# Patient Record
Sex: FEMALE | Race: WHITE | NOT HISPANIC OR LATINO | URBAN - METROPOLITAN AREA
[De-identification: names, ages, dates, MRNs, and addresses within clinical notes are randomized per-mention and may not be internally consistent; named-entity substitution may affect disease eponyms.]

---

## 2018-09-27 ENCOUNTER — INPATIENT (INPATIENT)
Facility: HOSPITAL | Age: 28
LOS: 1 days | Discharge: HOME | DRG: 872 | End: 2018-09-29
Attending: HOSPITALIST | Admitting: FAMILY MEDICINE
Payer: COMMERCIAL

## 2018-09-27 VITALS — HEIGHT: 67 IN | WEIGHT: 113.98 LBS

## 2018-09-27 DIAGNOSIS — A41.9 SEPSIS, UNSPECIFIED ORGANISM: ICD-10-CM

## 2018-09-27 DIAGNOSIS — Z98.890 OTHER SPECIFIED POSTPROCEDURAL STATES: Chronic | ICD-10-CM

## 2018-09-27 DIAGNOSIS — E87.6 HYPOKALEMIA: ICD-10-CM

## 2018-09-27 DIAGNOSIS — N12 TUBULO-INTERSTITIAL NEPHRITIS, NOT SPECIFIED AS ACUTE OR CHRONIC: ICD-10-CM

## 2018-09-27 LAB
ALBUMIN SERPL ELPH-MCNC: 3.7 G/DL — SIGNIFICANT CHANGE UP (ref 3.3–5.2)
ALP SERPL-CCNC: 75 U/L — SIGNIFICANT CHANGE UP (ref 40–120)
ALT FLD-CCNC: 13 U/L — SIGNIFICANT CHANGE UP
ANION GAP SERPL CALC-SCNC: 15 MMOL/L — SIGNIFICANT CHANGE UP (ref 5–17)
APPEARANCE UR: ABNORMAL
APTT BLD: 32.8 SEC — SIGNIFICANT CHANGE UP (ref 27.5–37.4)
AST SERPL-CCNC: 17 U/L — SIGNIFICANT CHANGE UP
BACTERIA # UR AUTO: ABNORMAL
BASOPHILS # BLD AUTO: 0 K/UL — SIGNIFICANT CHANGE UP (ref 0–0.2)
BILIRUB SERPL-MCNC: 0.4 MG/DL — SIGNIFICANT CHANGE UP (ref 0.4–2)
BILIRUB UR-MCNC: NEGATIVE — SIGNIFICANT CHANGE UP
BUN SERPL-MCNC: 13 MG/DL — SIGNIFICANT CHANGE UP (ref 8–20)
CALCIUM SERPL-MCNC: 9.2 MG/DL — SIGNIFICANT CHANGE UP (ref 8.6–10.2)
CHLORIDE SERPL-SCNC: 92 MMOL/L — LOW (ref 98–107)
CO2 SERPL-SCNC: 25 MMOL/L — SIGNIFICANT CHANGE UP (ref 22–29)
COLOR SPEC: YELLOW — SIGNIFICANT CHANGE UP
COMMENT - URINE: SIGNIFICANT CHANGE UP
CREAT SERPL-MCNC: 0.69 MG/DL — SIGNIFICANT CHANGE UP (ref 0.5–1.3)
DIFF PNL FLD: ABNORMAL
EOSINOPHIL # BLD AUTO: 0 K/UL — SIGNIFICANT CHANGE UP (ref 0–0.5)
EPI CELLS # UR: SIGNIFICANT CHANGE UP
GLUCOSE SERPL-MCNC: 112 MG/DL — SIGNIFICANT CHANGE UP (ref 70–115)
GLUCOSE UR QL: NEGATIVE MG/DL — SIGNIFICANT CHANGE UP
HCG SERPL-ACNC: <5 MIU/ML — SIGNIFICANT CHANGE UP
HCT VFR BLD CALC: 42.5 % — SIGNIFICANT CHANGE UP (ref 37–47)
HGB BLD-MCNC: 14.2 G/DL — SIGNIFICANT CHANGE UP (ref 12–16)
INR BLD: 1.3 RATIO — HIGH (ref 0.88–1.16)
KETONES UR-MCNC: NEGATIVE — SIGNIFICANT CHANGE UP
LACTATE BLDV-MCNC: 1.6 MMOL/L — SIGNIFICANT CHANGE UP (ref 0.5–2)
LEUKOCYTE ESTERASE UR-ACNC: ABNORMAL
LYMPHOCYTES # BLD AUTO: 1.1 K/UL — SIGNIFICANT CHANGE UP (ref 1–4.8)
LYMPHOCYTES # BLD AUTO: 9 % — LOW (ref 20–55)
MCHC RBC-ENTMCNC: 28.6 PG — SIGNIFICANT CHANGE UP (ref 27–31)
MCHC RBC-ENTMCNC: 33.4 G/DL — SIGNIFICANT CHANGE UP (ref 32–36)
MCV RBC AUTO: 85.5 FL — SIGNIFICANT CHANGE UP (ref 81–99)
MONOCYTES # BLD AUTO: 1.7 K/UL — HIGH (ref 0–0.8)
MONOCYTES NFR BLD AUTO: 15 % — HIGH (ref 3–10)
NEUTROPHILS # BLD AUTO: 8.8 K/UL — HIGH (ref 1.8–8)
NEUTROPHILS NFR BLD AUTO: 76 % — HIGH (ref 37–73)
NITRITE UR-MCNC: NEGATIVE — SIGNIFICANT CHANGE UP
PH UR: 6.5 — SIGNIFICANT CHANGE UP (ref 5–8)
PLAT MORPH BLD: NORMAL — SIGNIFICANT CHANGE UP
PLATELET # BLD AUTO: 155 K/UL — SIGNIFICANT CHANGE UP (ref 150–400)
POTASSIUM SERPL-MCNC: 3.3 MMOL/L — LOW (ref 3.5–5.3)
POTASSIUM SERPL-SCNC: 3.3 MMOL/L — LOW (ref 3.5–5.3)
PROT SERPL-MCNC: 8 G/DL — SIGNIFICANT CHANGE UP (ref 6.6–8.7)
PROT UR-MCNC: 100 MG/DL
PROTHROM AB SERPL-ACNC: 14.4 SEC — HIGH (ref 9.8–12.7)
RBC # BLD: 4.97 M/UL — SIGNIFICANT CHANGE UP (ref 4.4–5.2)
RBC # FLD: 12.4 % — SIGNIFICANT CHANGE UP (ref 11–15.6)
RBC BLD AUTO: NORMAL — SIGNIFICANT CHANGE UP
SODIUM SERPL-SCNC: 132 MMOL/L — LOW (ref 135–145)
SP GR SPEC: 1 — LOW (ref 1.01–1.02)
UROBILINOGEN FLD QL: NEGATIVE MG/DL — SIGNIFICANT CHANGE UP
WBC # BLD: 11.7 K/UL — HIGH (ref 4.8–10.8)
WBC # FLD AUTO: 11.7 K/UL — HIGH (ref 4.8–10.8)
WBC UR QL: >50

## 2018-09-27 PROCEDURE — 74177 CT ABD & PELVIS W/CONTRAST: CPT | Mod: 26

## 2018-09-27 PROCEDURE — 71045 X-RAY EXAM CHEST 1 VIEW: CPT | Mod: 26

## 2018-09-27 PROCEDURE — 99291 CRITICAL CARE FIRST HOUR: CPT

## 2018-09-27 PROCEDURE — 93010 ELECTROCARDIOGRAM REPORT: CPT

## 2018-09-27 RX ORDER — ACETAMINOPHEN 500 MG
650 TABLET ORAL ONCE
Qty: 0 | Refills: 0 | Status: COMPLETED | OUTPATIENT
Start: 2018-09-27 | End: 2018-09-27

## 2018-09-27 RX ORDER — IBUPROFEN 200 MG
400 TABLET ORAL ONCE
Qty: 0 | Refills: 0 | Status: COMPLETED | OUTPATIENT
Start: 2018-09-27 | End: 2018-09-27

## 2018-09-27 RX ORDER — PIPERACILLIN AND TAZOBACTAM 4; .5 G/20ML; G/20ML
3.38 INJECTION, POWDER, LYOPHILIZED, FOR SOLUTION INTRAVENOUS EVERY 8 HOURS
Qty: 0 | Refills: 0 | Status: DISCONTINUED | OUTPATIENT
Start: 2018-09-27 | End: 2018-09-29

## 2018-09-27 RX ORDER — POTASSIUM CHLORIDE 20 MEQ
40 PACKET (EA) ORAL ONCE
Qty: 0 | Refills: 0 | Status: COMPLETED | OUTPATIENT
Start: 2018-09-27 | End: 2018-09-28

## 2018-09-27 RX ORDER — SODIUM CHLORIDE 9 MG/ML
1000 INJECTION INTRAMUSCULAR; INTRAVENOUS; SUBCUTANEOUS ONCE
Qty: 0 | Refills: 0 | Status: COMPLETED | OUTPATIENT
Start: 2018-09-27 | End: 2018-09-27

## 2018-09-27 RX ORDER — MORPHINE SULFATE 50 MG/1
2 CAPSULE, EXTENDED RELEASE ORAL ONCE
Qty: 0 | Refills: 0 | Status: DISCONTINUED | OUTPATIENT
Start: 2018-09-27 | End: 2018-09-27

## 2018-09-27 RX ORDER — VANCOMYCIN HCL 1 G
1000 VIAL (EA) INTRAVENOUS ONCE
Qty: 0 | Refills: 0 | Status: COMPLETED | OUTPATIENT
Start: 2018-09-27 | End: 2018-09-27

## 2018-09-27 RX ORDER — LACTOBACILLUS ACIDOPHILUS 100MM CELL
1 CAPSULE ORAL
Qty: 0 | Refills: 0 | Status: DISCONTINUED | OUTPATIENT
Start: 2018-09-27 | End: 2018-09-28

## 2018-09-27 RX ORDER — SODIUM CHLORIDE 9 MG/ML
1550 INJECTION INTRAMUSCULAR; INTRAVENOUS; SUBCUTANEOUS ONCE
Qty: 0 | Refills: 0 | Status: COMPLETED | OUTPATIENT
Start: 2018-09-27 | End: 2018-09-27

## 2018-09-27 RX ORDER — ACETAMINOPHEN 500 MG
650 TABLET ORAL EVERY 6 HOURS
Qty: 0 | Refills: 0 | Status: DISCONTINUED | OUTPATIENT
Start: 2018-09-27 | End: 2018-09-29

## 2018-09-27 RX ORDER — SODIUM CHLORIDE 9 MG/ML
1000 INJECTION INTRAMUSCULAR; INTRAVENOUS; SUBCUTANEOUS
Qty: 0 | Refills: 0 | Status: DISCONTINUED | OUTPATIENT
Start: 2018-09-27 | End: 2018-09-29

## 2018-09-27 RX ORDER — PIPERACILLIN AND TAZOBACTAM 4; .5 G/20ML; G/20ML
3.38 INJECTION, POWDER, LYOPHILIZED, FOR SOLUTION INTRAVENOUS ONCE
Qty: 0 | Refills: 0 | Status: COMPLETED | OUTPATIENT
Start: 2018-09-27 | End: 2018-09-27

## 2018-09-27 RX ORDER — ONDANSETRON 8 MG/1
4 TABLET, FILM COATED ORAL EVERY 6 HOURS
Qty: 0 | Refills: 0 | Status: DISCONTINUED | OUTPATIENT
Start: 2018-09-27 | End: 2018-09-29

## 2018-09-27 RX ADMIN — SODIUM CHLORIDE 1550 MILLILITER(S): 9 INJECTION INTRAMUSCULAR; INTRAVENOUS; SUBCUTANEOUS at 14:51

## 2018-09-27 RX ADMIN — Medication 650 MILLIGRAM(S): at 17:40

## 2018-09-27 RX ADMIN — MORPHINE SULFATE 2 MILLIGRAM(S): 50 CAPSULE, EXTENDED RELEASE ORAL at 11:55

## 2018-09-27 RX ADMIN — PIPERACILLIN AND TAZOBACTAM 3.38 GRAM(S): 4; .5 INJECTION, POWDER, LYOPHILIZED, FOR SOLUTION INTRAVENOUS at 14:51

## 2018-09-27 RX ADMIN — SODIUM CHLORIDE 1550 MILLILITER(S): 9 INJECTION INTRAMUSCULAR; INTRAVENOUS; SUBCUTANEOUS at 11:58

## 2018-09-27 RX ADMIN — Medication 400 MILLIGRAM(S): at 17:10

## 2018-09-27 RX ADMIN — Medication 1000 MILLIGRAM(S): at 12:59

## 2018-09-27 RX ADMIN — PIPERACILLIN AND TAZOBACTAM 200 GRAM(S): 4; .5 INJECTION, POWDER, LYOPHILIZED, FOR SOLUTION INTRAVENOUS at 12:50

## 2018-09-27 RX ADMIN — SODIUM CHLORIDE 1000 MILLILITER(S): 9 INJECTION INTRAMUSCULAR; INTRAVENOUS; SUBCUTANEOUS at 17:10

## 2018-09-27 RX ADMIN — SODIUM CHLORIDE 150 MILLILITER(S): 9 INJECTION INTRAMUSCULAR; INTRAVENOUS; SUBCUTANEOUS at 20:30

## 2018-09-27 RX ADMIN — Medication 250 MILLIGRAM(S): at 11:55

## 2018-09-27 RX ADMIN — MORPHINE SULFATE 2 MILLIGRAM(S): 50 CAPSULE, EXTENDED RELEASE ORAL at 17:40

## 2018-09-27 RX ADMIN — Medication 650 MILLIGRAM(S): at 11:55

## 2018-09-27 RX ADMIN — Medication 400 MILLIGRAM(S): at 17:41

## 2018-09-27 RX ADMIN — Medication 650 MILLIGRAM(S): at 16:41

## 2018-09-27 NOTE — SBIRT NOTE. - NSSBIRTSERVICES_GEN_A_ED_FT
Provided SBIRT services: Full screen positive. Brief Intervention Performed. Screening results were reviewed with the patient and patient was provided information about healthy guidelines and potential negative consequences associated with level of risk. Motivation and readiness to reduce or stop use was discussed and goals and activities to make changes were suggested/offered.  Audit Score: 9  DAST Score: 0  Duration = 10 Minutes

## 2018-09-27 NOTE — ED ADULT NURSE REASSESSMENT NOTE - NS ED NURSE REASSESS COMMENT FT1
Report received from offgoing RN, charting as noted. Patient is A&Ox4, denies any pain or discomfort. Patient resting comfortably in bed, no complaints at this time.  Will continue to monitor and reassess as indicated.

## 2018-09-27 NOTE — ED PROVIDER NOTE - OBJECTIVE STATEMENT
The patient is a 28 year old female presents with few days of right sided abd pain with fever and decrease in urination. Also R flank pain. No chest pain, NO SOB, No cough, No recent travel, No sore throat

## 2018-09-27 NOTE — H&P ADULT - NSHPPHYSICALEXAM_GEN_ALL_CORE
General: Well developed female lying in bed not in distress.   HEENT: AT, NC. PERRL. intact EOM. no throat erythema or exudate. dry mucous membranes.   Neck: supple. no JVD.   Chest: CTA bilaterally  Heart: S1,S2. tachy, no heart murmur.  Abdomen: soft. + rt. flank tenderness , non-distended. + BS.  Ext: no C/C/E. no calf tenderness.  FROM of all joints.  Vascular : 2 + DP.  Neuro: AAO x3. no focal weakness. no speech disorder. CN II to XII intact.   Skin: no rash noted, warm and dry.  some facial flushing noted.

## 2018-09-27 NOTE — ED PROVIDER NOTE - CHPI ED SYMPTOMS NEG
no hematuria/no diarrhea/no abdominal distension/no blood in stool/no burning urination/no vomiting/no dysuria

## 2018-09-27 NOTE — H&P ADULT - HISTORY OF PRESENT ILLNESS
27 y/o female came to ER for on and off rt. flank area pain on and off for past 4 days, some chills, did not document fever at home. pt. has noted decreased urine out put but denies dysuria or urgency.  no vaginal discharge. no prior h/o utis. no n/v/d. no cp. no sob.

## 2018-09-27 NOTE — ED ADULT TRIAGE NOTE - CHIEF COMPLAINT QUOTE
Pt ambulatory in ED c/o RLQ abdominal pain with associated fever, states took Tylenol at 0600 today.

## 2018-09-27 NOTE — ED ADULT NURSE NOTE - OBJECTIVE STATEMENT
Pt comes in complaining of flank/ abdominal /back pain w/ fever starting saturday. Pt went to urgent care on Wed.

## 2018-09-28 LAB
ANION GAP SERPL CALC-SCNC: 13 MMOL/L — SIGNIFICANT CHANGE UP (ref 5–17)
BASOPHILS # BLD AUTO: 0 K/UL — SIGNIFICANT CHANGE UP (ref 0–0.2)
BASOPHILS NFR BLD AUTO: 0.1 % — SIGNIFICANT CHANGE UP (ref 0–2)
BUN SERPL-MCNC: 10 MG/DL — SIGNIFICANT CHANGE UP (ref 8–20)
CALCIUM SERPL-MCNC: 7.7 MG/DL — LOW (ref 8.6–10.2)
CHLORIDE SERPL-SCNC: 103 MMOL/L — SIGNIFICANT CHANGE UP (ref 98–107)
CO2 SERPL-SCNC: 22 MMOL/L — SIGNIFICANT CHANGE UP (ref 22–29)
CREAT SERPL-MCNC: 0.63 MG/DL — SIGNIFICANT CHANGE UP (ref 0.5–1.3)
EOSINOPHIL # BLD AUTO: 0 K/UL — SIGNIFICANT CHANGE UP (ref 0–0.5)
EOSINOPHIL NFR BLD AUTO: 0.1 % — SIGNIFICANT CHANGE UP (ref 0–6)
GLUCOSE SERPL-MCNC: 108 MG/DL — SIGNIFICANT CHANGE UP (ref 70–115)
HCT VFR BLD CALC: 35.8 % — LOW (ref 37–47)
HGB BLD-MCNC: 11.3 G/DL — LOW (ref 12–16)
LYMPHOCYTES # BLD AUTO: 0.8 K/UL — LOW (ref 1–4.8)
LYMPHOCYTES # BLD AUTO: 11.3 % — LOW (ref 20–55)
MCHC RBC-ENTMCNC: 28 PG — SIGNIFICANT CHANGE UP (ref 27–31)
MCHC RBC-ENTMCNC: 31.6 G/DL — LOW (ref 32–36)
MCV RBC AUTO: 88.6 FL — SIGNIFICANT CHANGE UP (ref 81–99)
MONOCYTES # BLD AUTO: 1.4 K/UL — HIGH (ref 0–0.8)
MONOCYTES NFR BLD AUTO: 19.1 % — HIGH (ref 3–10)
NEUTROPHILS # BLD AUTO: 5 K/UL — SIGNIFICANT CHANGE UP (ref 1.8–8)
NEUTROPHILS NFR BLD AUTO: 69.1 % — SIGNIFICANT CHANGE UP (ref 37–73)
PLATELET # BLD AUTO: 117 K/UL — LOW (ref 150–400)
POTASSIUM SERPL-MCNC: 3 MMOL/L — LOW (ref 3.5–5.3)
POTASSIUM SERPL-SCNC: 3 MMOL/L — LOW (ref 3.5–5.3)
RBC # BLD: 4.04 M/UL — LOW (ref 4.4–5.2)
RBC # FLD: 12.7 % — SIGNIFICANT CHANGE UP (ref 11–15.6)
SODIUM SERPL-SCNC: 138 MMOL/L — SIGNIFICANT CHANGE UP (ref 135–145)
WBC # BLD: 7.3 K/UL — SIGNIFICANT CHANGE UP (ref 4.8–10.8)
WBC # FLD AUTO: 7.3 K/UL — SIGNIFICANT CHANGE UP (ref 4.8–10.8)

## 2018-09-28 PROCEDURE — 99232 SBSQ HOSP IP/OBS MODERATE 35: CPT

## 2018-09-28 RX ORDER — POTASSIUM CHLORIDE 20 MEQ
40 PACKET (EA) ORAL EVERY 4 HOURS
Qty: 0 | Refills: 0 | Status: COMPLETED | OUTPATIENT
Start: 2018-09-28 | End: 2018-09-28

## 2018-09-28 RX ORDER — INFLUENZA VIRUS VACCINE 15; 15; 15; 15 UG/.5ML; UG/.5ML; UG/.5ML; UG/.5ML
0.5 SUSPENSION INTRAMUSCULAR ONCE
Qty: 0 | Refills: 0 | Status: DISCONTINUED | OUTPATIENT
Start: 2018-09-28 | End: 2018-09-29

## 2018-09-28 RX ORDER — SACCHAROMYCES BOULARDII 250 MG
250 POWDER IN PACKET (EA) ORAL
Qty: 0 | Refills: 0 | Status: DISCONTINUED | OUTPATIENT
Start: 2018-09-28 | End: 2018-09-29

## 2018-09-28 RX ADMIN — Medication 650 MILLIGRAM(S): at 05:00

## 2018-09-28 RX ADMIN — PIPERACILLIN AND TAZOBACTAM 25 GRAM(S): 4; .5 INJECTION, POWDER, LYOPHILIZED, FOR SOLUTION INTRAVENOUS at 15:10

## 2018-09-28 RX ADMIN — Medication 1 TABLET(S): at 00:12

## 2018-09-28 RX ADMIN — Medication 650 MILLIGRAM(S): at 18:51

## 2018-09-28 RX ADMIN — PIPERACILLIN AND TAZOBACTAM 25 GRAM(S): 4; .5 INJECTION, POWDER, LYOPHILIZED, FOR SOLUTION INTRAVENOUS at 22:07

## 2018-09-28 RX ADMIN — Medication 40 MILLIEQUIVALENT(S): at 05:39

## 2018-09-28 RX ADMIN — PIPERACILLIN AND TAZOBACTAM 25 GRAM(S): 4; .5 INJECTION, POWDER, LYOPHILIZED, FOR SOLUTION INTRAVENOUS at 00:13

## 2018-09-28 RX ADMIN — Medication 40 MILLIEQUIVALENT(S): at 15:10

## 2018-09-28 RX ADMIN — Medication 650 MILLIGRAM(S): at 04:29

## 2018-09-28 RX ADMIN — PIPERACILLIN AND TAZOBACTAM 25 GRAM(S): 4; .5 INJECTION, POWDER, LYOPHILIZED, FOR SOLUTION INTRAVENOUS at 05:39

## 2018-09-28 RX ADMIN — Medication 650 MILLIGRAM(S): at 18:18

## 2018-09-28 NOTE — PROGRESS NOTE ADULT - ASSESSMENT
Problem/Plan - 1:  ·  Problem: Pyelonephritis.  --> iv abx, fluids  --> clinicailly improving  --> secondary to gram neg      Problem/Plan - 2:  ·  Problem: Sepsis, due to unspecified organism.  resolved    dispo --> likely dc tomorrow

## 2018-09-28 NOTE — PROGRESS NOTE ADULT - SUBJECTIVE AND OBJECTIVE BOX
SERAFIN CHINCHILLA    [id number]    28y      Female    INTERVAL HPI/OVERNIGHT EVENTS:    patient being seen for pyelo. patient seen at bedsdie and states feeling better    tmax 102.3    REVIEW OF SYSTEMS:    CONSTITUTIONAL: No fever, weight loss, or fatigue  RESPIRATORY: No cough, wheezing, hemoptysis; No shortness of breath  CARDIOVASCULAR: No chest pain, palpitations  GASTROINTESTINAL: No abdominal or epigastric pain. No nausea, vomiting  NEUROLOGICAL: No headaches, memory loss, loss of strength.  MISCELLANEOUS:      Vital Signs Last 24 Hrs  T(C): 36.8 (28 Sep 2018 11:47), Max: 39.1 (27 Sep 2018 16:06)  T(F): 98.3 (28 Sep 2018 11:47), Max: 102.3 (27 Sep 2018 16:06)  HR: 87 (28 Sep 2018 11:47) (85 - 115)  BP: 102/64 (28 Sep 2018 11:47) (102/52 - 126/72)  BP(mean): --  RR: 17 (28 Sep 2018 11:47) (17 - 24)  SpO2: 97% (28 Sep 2018 11:47) (89% - 100%)    PHYSICAL EXAM:    GENERAL: NAD, well-groomed  HEENT: PERRL, +EOMI  NECK: soft, Supple, No JVD,   CHEST/LUNG: Clear to auscultation bilaterally; No wheezing  HEART: S1S2+, Regular rate and rhythm; No murmurs, rubs, or gallops  ABDOMEN: Soft, Nontender, Nondistended; Bowel sounds present  EXTREMITIES:  right cva tenderness  SKIN: No rashes or lesions  NEURO: AAOX3, no focal deficits, no motor r sensory loss  PSYCH: normal mood      LABS:                        11.3   7.3   )-----------( 117      ( 28 Sep 2018 07:04 )             35.8     -    138  |  103  |  10.0  ----------------------------<  108  3.0<L>   |  22.0  |  0.63    Ca    7.7<L>      28 Sep 2018 07:04    TPro  8.0  /  Alb  3.7  /  TBili  0.4  /  DBili  x   /  AST  17  /  ALT  13  /  AlkPhos  75      PT/INR - ( 27 Sep 2018 11:32 )   PT: 14.4 sec;   INR: 1.30 ratio         PTT - ( 27 Sep 2018 11:32 )  PTT:32.8 sec  Urinalysis Basic - ( 27 Sep 2018 12:03 )    Color: Yellow / Appearance: Slightly Turbid / S.005 / pH: x  Gluc: x / Ketone: Negative  / Bili: Negative / Urobili: Negative mg/dL   Blood: x / Protein: 100 mg/dL / Nitrite: Negative   Leuk Esterase: Moderate / RBC: x / WBC >50   Sq Epi: x / Non Sq Epi: Occasional / Bacteria: Moderate          MEDICATIONS  (STANDING):  piperacillin/tazobactam IVPB. 3.375 Gram(s) IV Intermittent every 8 hours  potassium chloride    Tablet ER 40 milliEquivalent(s) Oral every 4 hours  saccharomyces boulardii 250 milliGRAM(s) Oral two times a day  sodium chloride 0.9%. 1000 milliLiter(s) (150 mL/Hr) IV Continuous <Continuous>    MEDICATIONS  (PRN):  acetaminophen   Tablet .. 650 milliGRAM(s) Oral every 6 hours PRN Temp greater or equal to 38C (100.4F)  ondansetron Injectable 4 milliGRAM(s) IV Push every 6 hours PRN Nausea and/or Vomiting      RADIOLOGY & ADDITIONAL TESTS:

## 2018-09-29 VITALS
HEART RATE: 95 BPM | TEMPERATURE: 98 F | OXYGEN SATURATION: 100 % | DIASTOLIC BLOOD PRESSURE: 31 MMHG | RESPIRATION RATE: 17 BRPM | SYSTOLIC BLOOD PRESSURE: 107 MMHG

## 2018-09-29 LAB
-  AMIKACIN: SIGNIFICANT CHANGE UP
-  AMPICILLIN/SULBACTAM: SIGNIFICANT CHANGE UP
-  AMPICILLIN: SIGNIFICANT CHANGE UP
-  AZTREONAM: SIGNIFICANT CHANGE UP
-  CEFAZOLIN: SIGNIFICANT CHANGE UP
-  CEFEPIME: SIGNIFICANT CHANGE UP
-  CEFOXITIN: SIGNIFICANT CHANGE UP
-  CEFTRIAXONE: SIGNIFICANT CHANGE UP
-  CIPROFLOXACIN: SIGNIFICANT CHANGE UP
-  ERTAPENEM: SIGNIFICANT CHANGE UP
-  GENTAMICIN: SIGNIFICANT CHANGE UP
-  IMIPENEM: SIGNIFICANT CHANGE UP
-  LEVOFLOXACIN: SIGNIFICANT CHANGE UP
-  MEROPENEM: SIGNIFICANT CHANGE UP
-  NITROFURANTOIN: SIGNIFICANT CHANGE UP
-  PIPERACILLIN/TAZOBACTAM: SIGNIFICANT CHANGE UP
-  TIGECYCLINE: SIGNIFICANT CHANGE UP
-  TOBRAMYCIN: SIGNIFICANT CHANGE UP
-  TRIMETHOPRIM/SULFAMETHOXAZOLE: SIGNIFICANT CHANGE UP
ANION GAP SERPL CALC-SCNC: 13 MMOL/L — SIGNIFICANT CHANGE UP (ref 5–17)
BUN SERPL-MCNC: 7 MG/DL — LOW (ref 8–20)
CALCIUM SERPL-MCNC: 8.3 MG/DL — LOW (ref 8.6–10.2)
CHLORIDE SERPL-SCNC: 102 MMOL/L — SIGNIFICANT CHANGE UP (ref 98–107)
CO2 SERPL-SCNC: 22 MMOL/L — SIGNIFICANT CHANGE UP (ref 22–29)
CREAT SERPL-MCNC: 0.59 MG/DL — SIGNIFICANT CHANGE UP (ref 0.5–1.3)
CULTURE RESULTS: SIGNIFICANT CHANGE UP
GLUCOSE SERPL-MCNC: 102 MG/DL — SIGNIFICANT CHANGE UP (ref 70–115)
HCT VFR BLD CALC: 35.1 % — LOW (ref 37–47)
HGB BLD-MCNC: 11.8 G/DL — LOW (ref 12–16)
MAGNESIUM SERPL-MCNC: 1.5 MG/DL — LOW (ref 1.6–2.6)
MCHC RBC-ENTMCNC: 28.8 PG — SIGNIFICANT CHANGE UP (ref 27–31)
MCHC RBC-ENTMCNC: 33.6 G/DL — SIGNIFICANT CHANGE UP (ref 32–36)
MCV RBC AUTO: 85.6 FL — SIGNIFICANT CHANGE UP (ref 81–99)
METHOD TYPE: SIGNIFICANT CHANGE UP
ORGANISM # SPEC MICROSCOPIC CNT: SIGNIFICANT CHANGE UP
ORGANISM # SPEC MICROSCOPIC CNT: SIGNIFICANT CHANGE UP
PLATELET # BLD AUTO: 166 K/UL — SIGNIFICANT CHANGE UP (ref 150–400)
POTASSIUM SERPL-MCNC: 3.5 MMOL/L — SIGNIFICANT CHANGE UP (ref 3.5–5.3)
POTASSIUM SERPL-SCNC: 3.5 MMOL/L — SIGNIFICANT CHANGE UP (ref 3.5–5.3)
RBC # BLD: 4.1 M/UL — LOW (ref 4.4–5.2)
RBC # FLD: 12.6 % — SIGNIFICANT CHANGE UP (ref 11–15.6)
SODIUM SERPL-SCNC: 137 MMOL/L — SIGNIFICANT CHANGE UP (ref 135–145)
SPECIMEN SOURCE: SIGNIFICANT CHANGE UP
WBC # BLD: 9.6 K/UL — SIGNIFICANT CHANGE UP (ref 4.8–10.8)
WBC # FLD AUTO: 9.6 K/UL — SIGNIFICANT CHANGE UP (ref 4.8–10.8)

## 2018-09-29 PROCEDURE — 93005 ELECTROCARDIOGRAM TRACING: CPT

## 2018-09-29 PROCEDURE — 85730 THROMBOPLASTIN TIME PARTIAL: CPT

## 2018-09-29 PROCEDURE — 81001 URINALYSIS AUTO W/SCOPE: CPT

## 2018-09-29 PROCEDURE — 80053 COMPREHEN METABOLIC PANEL: CPT

## 2018-09-29 PROCEDURE — 71045 X-RAY EXAM CHEST 1 VIEW: CPT

## 2018-09-29 PROCEDURE — 74177 CT ABD & PELVIS W/CONTRAST: CPT

## 2018-09-29 PROCEDURE — 87186 SC STD MICRODIL/AGAR DIL: CPT

## 2018-09-29 PROCEDURE — 96368 THER/DIAG CONCURRENT INF: CPT

## 2018-09-29 PROCEDURE — 96365 THER/PROPH/DIAG IV INF INIT: CPT | Mod: XU

## 2018-09-29 PROCEDURE — 80048 BASIC METABOLIC PNL TOTAL CA: CPT

## 2018-09-29 PROCEDURE — 84702 CHORIONIC GONADOTROPIN TEST: CPT

## 2018-09-29 PROCEDURE — 83605 ASSAY OF LACTIC ACID: CPT

## 2018-09-29 PROCEDURE — 87086 URINE CULTURE/COLONY COUNT: CPT

## 2018-09-29 PROCEDURE — 96375 TX/PRO/DX INJ NEW DRUG ADDON: CPT

## 2018-09-29 PROCEDURE — 99285 EMERGENCY DEPT VISIT HI MDM: CPT | Mod: 25

## 2018-09-29 PROCEDURE — 83735 ASSAY OF MAGNESIUM: CPT

## 2018-09-29 PROCEDURE — 36415 COLL VENOUS BLD VENIPUNCTURE: CPT

## 2018-09-29 PROCEDURE — 85610 PROTHROMBIN TIME: CPT

## 2018-09-29 PROCEDURE — 87040 BLOOD CULTURE FOR BACTERIA: CPT

## 2018-09-29 PROCEDURE — 99239 HOSP IP/OBS DSCHRG MGMT >30: CPT

## 2018-09-29 PROCEDURE — 85027 COMPLETE CBC AUTOMATED: CPT

## 2018-09-29 RX ORDER — MAGNESIUM SULFATE 500 MG/ML
2 VIAL (ML) INJECTION ONCE
Qty: 0 | Refills: 0 | Status: COMPLETED | OUTPATIENT
Start: 2018-09-29 | End: 2018-09-29

## 2018-09-29 RX ADMIN — Medication 650 MILLIGRAM(S): at 08:16

## 2018-09-29 RX ADMIN — Medication 650 MILLIGRAM(S): at 08:50

## 2018-09-29 RX ADMIN — SODIUM CHLORIDE 150 MILLILITER(S): 9 INJECTION INTRAMUSCULAR; INTRAVENOUS; SUBCUTANEOUS at 01:52

## 2018-09-29 RX ADMIN — Medication 250 MILLIGRAM(S): at 06:05

## 2018-09-29 RX ADMIN — Medication 50 GRAM(S): at 09:50

## 2018-09-29 RX ADMIN — PIPERACILLIN AND TAZOBACTAM 25 GRAM(S): 4; .5 INJECTION, POWDER, LYOPHILIZED, FOR SOLUTION INTRAVENOUS at 06:04

## 2018-09-29 RX ADMIN — SODIUM CHLORIDE 150 MILLILITER(S): 9 INJECTION INTRAMUSCULAR; INTRAVENOUS; SUBCUTANEOUS at 06:04

## 2018-09-29 NOTE — DISCHARGE NOTE ADULT - PATIENT PORTAL LINK FT
You can access the [facility], offered by [facility], by registering with the following website: [email or web address]

## 2018-09-29 NOTE — DISCHARGE NOTE ADULT - HOSPITAL COURSE
27 y/o female came to ER for on and off rt. flank area pain on and off for past 4 days, some chills, did not document fever at home. pt. has noted decreased urine out put but denies dysuria or urgency.      patient admitted as sepsis secondary to pyelo. Patient started on iv abx and cultured. Patient imprvoed clinically and no longer complains of fever or chills. Patient had gram neg bacteria grow from cultures. Paitent discontinued with po abx    time spent on dc32 minutes

## 2018-09-29 NOTE — DISCHARGE NOTE ADULT - MEDICATION SUMMARY - MEDICATIONS TO TAKE
I will START or STAY ON the medications listed below when I get home from the hospital:    Levaquin 750 mg oral tablet  -- 1 tab(s) by mouth every 24 hours   -- Avoid prolonged or excessive exposure to direct and/or artificial sunlight while taking this medication.  Do not take dairy products, antacids, or iron preparations within one hour of this medication.  Finish all this medication unless otherwise directed by prescriber.  May cause drowsiness or dizziness.  Medication should be taken with plenty of water.    -- Indication: For Pyelonephritis

## 2018-09-29 NOTE — DISCHARGE NOTE ADULT - PROVIDER TOKENS
FREE:[LAST:[[facility]],PHONE:[(   )    -],FAX:[(   )    -],ADDRESS:[if patient does not have insurance]]

## 2018-09-29 NOTE — DISCHARGE NOTE ADULT - CARE PLAN
Principal Discharge DX:	Pyelonephritis  Goal:	improved  Assessment and plan of treatment:	secondary to gram neg uti  dc with po antibitics  Secondary Diagnosis:	Sepsis due to urinary tract infection  Goal:	presnet on admission  Assessment and plan of treatment:	resolved  Secondary Diagnosis:	Hypomagnesemia  Goal:	repleted

## 2018-10-02 LAB
CULTURE RESULTS: SIGNIFICANT CHANGE UP
CULTURE RESULTS: SIGNIFICANT CHANGE UP
SPECIMEN SOURCE: SIGNIFICANT CHANGE UP
SPECIMEN SOURCE: SIGNIFICANT CHANGE UP

## 2025-05-16 NOTE — ED ADULT NURSE NOTE - CHIEF COMPLAINT QUOTE
Pt ambulatory in ED c/o RLQ abdominal pain with associated fever, states took Tylenol at 0600 today. n/a